# Patient Record
Sex: FEMALE | URBAN - METROPOLITAN AREA
[De-identification: names, ages, dates, MRNs, and addresses within clinical notes are randomized per-mention and may not be internally consistent; named-entity substitution may affect disease eponyms.]

---

## 2018-01-12 ENCOUNTER — TELEPHONE (OUTPATIENT)
Dept: OBGYN | Facility: CLINIC | Age: 28
End: 2018-01-12

## 2018-01-12 ENCOUNTER — TRANSFERRED RECORDS (OUTPATIENT)
Dept: HEALTH INFORMATION MANAGEMENT | Facility: CLINIC | Age: 28
End: 2018-01-12

## 2018-01-12 DIAGNOSIS — O30.042 DICHORIONIC DIAMNIOTIC TWIN PREGNANCY IN SECOND TRIMESTER: Primary | ICD-10-CM

## 2018-01-12 RX ORDER — DOXYCYCLINE 100 MG/10ML
100 INJECTION, POWDER, LYOPHILIZED, FOR SOLUTION INTRAVENOUS
Status: CANCELLED | OUTPATIENT
Start: 2018-01-12

## 2018-01-12 NOTE — TELEPHONE ENCOUNTER
Received call from Dr. Huerta that pt is being referred to [facility] for termination of di/di twin pregnancy. She requires hospital based services due to anemia and hgb S trait (sickle cell trait).  Pt is 19 6/7 weeks gestation today. PT is currently inpatient at [facility] - committed for alcohol intoxication.    Called Chris in Financial and obtained approval with completion of 'Medical Necessity Statement'. This is complete and will be given to provider to sign on 1/16/18.    Awaiting 24 hr consent that will be faxed to us.    Left message for , Merly Dorantes, to talk with patient regarding fetal remains.    Procedure scheduled for lams on 1/16/18 and surgery on 1/17/18. Dr. Huerta is communicating with staff at [facility] regarding pt appointments. Surgery instructions to be given to pt when she is here for lams.

## 2018-01-12 NOTE — TELEPHONE ENCOUNTER
"Telephone Note    Received call from NATHALIE Hicks, who is currently caring for patient at [facility]. J Luis Poe is a 27 year old  at 19w6d by 12 weeks 5 day US with di/di twins who desires pregnancy termination. She is currently on a 72 hour hold at [facility], apparently for EtOH abuse, with plan to pursue legal commitment given \"risk to fetus,\" per notes. Her pregnancy is complicated by Hgb S trait, anemia and thrombocytopenia, with most recent Hgb 9 and plts 145 on 18. Given this, it was recommended that her dilation and evacuation procedure be performed in the hospital setting. Will plan to attempt to arrange this procedure for ASAP next week.   "

## 2018-01-15 NOTE — TELEPHONE ENCOUNTER
Received call from Merly Dorantes that a  from [facility] [place], will come to [facility] tomorrow, 1/16/18 at 3:45pm to meeting with pt to discuss fetal remains options.

## 2018-01-16 ENCOUNTER — DOCUMENTATION ONLY (OUTPATIENT)
Dept: CARE COORDINATION | Facility: CLINIC | Age: 28
End: 2018-01-16

## 2018-01-16 ENCOUNTER — ALLIED HEALTH/NURSE VISIT (OUTPATIENT)
Dept: OBGYN | Facility: CLINIC | Age: 28
End: 2018-01-16
Attending: OBSTETRICS & GYNECOLOGY
Payer: COMMERCIAL

## 2018-01-16 ENCOUNTER — MEDICAL CORRESPONDENCE (OUTPATIENT)
Dept: HEALTH INFORMATION MANAGEMENT | Facility: CLINIC | Age: 28
End: 2018-01-16

## 2018-01-16 ENCOUNTER — ANESTHESIA EVENT (OUTPATIENT)
Dept: SURGERY | Facility: CLINIC | Age: 28
End: 2018-01-16
Payer: MEDICAID

## 2018-01-16 VITALS — DIASTOLIC BLOOD PRESSURE: 68 MMHG | SYSTOLIC BLOOD PRESSURE: 107 MMHG | WEIGHT: 187.2 LBS

## 2018-01-16 DIAGNOSIS — O09.92 SUPERVISION OF HIGH RISK PREGNANCY, ANTEPARTUM, SECOND TRIMESTER: Primary | ICD-10-CM

## 2018-01-16 PROCEDURE — 59200 INSERT CERVICAL DILATOR: CPT | Mod: ZF | Performed by: STUDENT IN AN ORGANIZED HEALTH CARE EDUCATION/TRAINING PROGRAM

## 2018-01-16 PROCEDURE — 25000132 ZZH RX MED GY IP 250 OP 250 PS 637: Mod: ZF

## 2018-01-16 RX ORDER — METRONIDAZOLE 500 MG/1
500 TABLET ORAL ONCE
Qty: 1 TABLET | Refills: 0 | Status: ON HOLD
Start: 2018-01-16 | End: 2018-01-17

## 2018-01-16 RX ORDER — DOXYCYCLINE HYCLATE 200 MG/1
200 TABLET, DELAYED RELEASE ORAL ONCE
Qty: 1 TABLET | Refills: 0 | Status: SHIPPED | OUTPATIENT
Start: 2018-01-16 | End: 2018-01-16 | Stop reason: ALTCHOICE

## 2018-01-16 RX ORDER — MISOPROSTOL 200 UG/1
400 TABLET ORAL ONCE
Qty: 2 TABLET | Refills: 0 | Status: SHIPPED | OUTPATIENT
Start: 2018-01-16 | End: 2018-01-16

## 2018-01-16 RX ORDER — MISOPROSTOL 200 UG/1
400 TABLET ORAL ONCE
Qty: 2 TABLET | Refills: 0 | Status: ON HOLD | OUTPATIENT
Start: 2018-01-16 | End: 2018-01-17

## 2018-01-16 RX ORDER — DOXYCYCLINE HYCLATE 200 MG/1
200 TABLET, DELAYED RELEASE ORAL ONCE
Qty: 1 TABLET | Refills: 0 | Status: SHIPPED | OUTPATIENT
Start: 2018-01-16 | End: 2018-01-16

## 2018-01-16 RX ORDER — IBUPROFEN 600 MG/1
600 TABLET, FILM COATED ORAL EVERY 6 HOURS PRN
Qty: 30 TABLET | Refills: 0 | Status: ON HOLD | OUTPATIENT
Start: 2018-01-16 | End: 2018-01-17

## 2018-01-16 NOTE — MR AVS SNAPSHOT
After Visit Summary   1/16/2018    J Luis Poe    MRN: [id number]           Patient Information     Date Of Birth          1990        Visit Information        Provider Department      1/16/2018 3:15 PM Schumer, Amy, MD; [facility] RESOURCE PROCEDURE [facility]        Today's Diagnoses     Supervision of high risk pregnancy, antepartum, second trimester    -  1      Care Instructions    Try to rest tonight.  If the gauze or cervical dilators fall out, count how many fell out and let your team know in the morning.  If you have pain, you may take 600-800 mg of ibuprofen every 6-8 hours.  Take your dose of antibiotic as directed.  Take your misoprostol (Cytotec) 2 hours before your scheduled surgery start time if you were prescribed it.  If you have severe pain or cramping tonight or heavy bleeding-soaking more than a pad an hour-please call the main clinic number, [phone].  If it is after clinic hours, remain on the line to speak with an  who will have the on call physician for [facility] call you back.          Follow-ups after your visit        Follow-up notes from your care team     Return if symptoms worsen or fail to improve.      Your next 10 appointments already scheduled     Jan 30, 2018  2:30 PM CST   Return Visit with Tiffany Sullivan MD   [facility] ([facility])    [facility] [address]  [address]  [facility] [address] [phone]              Who to contact     Please call your clinic at [phone] to:    Ask questions about your health    Make or cancel appointments    Discuss your medicines    Learn about your test results    Speak to your doctor   If you have compliments or concerns about an experience at your clinic, or if you wish to file a complaint, please contact [facility] Patient Relations at [phone] or  email us at [email or web address]         Additional Information About Your Visit        Buddy[employer] Information     [employer] is an electronic gateway that provides easy, online access to your medical records. With [employer], you can request a clinic appointment, read your test results, renew a prescription or communicate with your care team.     To sign up for [employer] visit the website at [email or web address]   You will be asked to enter the access code listed below, as well as some personal information. Please follow the directions to create your username and password.     Your access code is: [email or web address]  Expires: 4/15/2018  6:30 AM     Your access code will  in 90 days. If you need help or a new code, please contact your [facility] or call [phone] for assistance.        Care EveryWhere ID     This is your Care EveryWhere ID. This could be used by other organizations to access your [place] medical records  [id number]         Blood Pressure from Last 3 Encounters:   18 111/69   18 107/68    Weight from Last 3 Encounters:   18 84.9 kg (187 lb 2.7 oz)   18 84.9 kg (187 lb 3.2 oz)              We Performed the Following     INJECT NERV BLCK,PARACERVICAL     INSERT CERVICAL DILATOR/RIPENING AGENT INITIAL        Primary Care Provider    None Specified       No address on file        Equal Access to Services     NAYA COKER : Hadii gunner ku josefo Socandi, waaxda luqadaha, qaybta kaalmada vandana, gino morataya . So [facility] [phone].    ATENCIÓN: Si habla español, tiene a ellison disposición servicios gratuitos de asistencia lingüística. Lilaame al [phone].    We comply with applicable federal civil rights laws and [place] laws. We do not discriminate on the basis of race, color, national origin, age, disability, sex, sexual orientation, or gender identity.            Thank you!     Thank you for choosing  [facility]  for your care. Our goal is always to provide you with excellent care. Hearing back from our patients is one way we can continue to improve our services. Please take a few minutes to complete the written survey that you may receive in the mail after your visit with us. Thank you!             Your Updated Medication List - Protect others around you: Learn how to safely use, store and throw away your medicines at www.disposemymeds.org.          This list is accurate as of: 1/16/18 11:59 PM.  Always use your most recent med list.                   Brand Name Dispense Instructions for use Diagnosis    ibuprofen 600 MG tablet    ADVIL/MOTRIN    30 tablet    Take 1 tablet (600 mg) by mouth every 6 hours as needed for moderate pain (Cramping)    Supervision of high risk pregnancy, antepartum, second trimester

## 2018-01-16 NOTE — LETTER
2018       RE: J Luis Poe  [address]  [place] [address]     Dear Colleague,    Thank you for referring your patient, J Luis Poe, to the [facility] at [facility]. Please see a copy of my visit note below.      Pre-Op History & Physical     Name:            J Luis Poe  YOB: 1990              Date of Surgery: 18      SUBJECTIVE:     J Luis Poe is a 27 year old  at 20w3d by 19w3d US who desires termination of pregnancy.  She is feeling ok today. She is coming to her appointment from her mother's .  She is certain she desires termination of her pregnancy.  She asked appropriate questions regarding the procedure tomorrow.  She expresses interest in a nexplanon placement tomorrow during her D&E procedure.  She also desires footprints.    Obstetric History:   - term    -     - term    - spontaneous    - term    - surgical     Gynecologic History:   No LMP recorded.  Contraception: plans to use Nexplanon  Denies history of abnormal paps, last 2016 NIL.  Denies history of STIs    Past Medical History:   Diagnosis Date     Alcohol use disorder (H)        Past Surgical History:   Procedure Laterality Date     DILATION AND CURETTAGE SUCTION      retained POCs     DILATION AND CURETTAGE SUCTION      surgical AB       Medications:  Current Outpatient Prescriptions   Medication Sig Dispense Refill     ibuprofen (ADVIL/MOTRIN) 600 MG tablet Take 1 tablet (600 mg) by mouth every 6 hours as needed for moderate pain 30 tablet 0     cervical dilator polymer (DILAPAN S) STCK insert Apply 1 Stick to cervix once for 1 dose Apply 1 Stick to cervix once for 1 dose 5 Stick 0     laminaria japonica (DILATERIA) thin pad Place 1 pad vaginally once for 1 dose 1 pad 0     misoprostol (CYTOTEC) 200 MCG tablet Place 2 tablets (400 mcg) inside  cheek once for 1 dose Bring to hospital, take in pre-op area 2 hours prior to procedure. 2 tablet 0     metroNIDAZOLE (FLAGYL) 500 MG tablet Take 1 tablet (500 mg) by mouth once for 1 dose 1 tablet 0       Allergies:   Allergies no known allergies    Social History:  Social History   Substance Use Topics     Smoking status: Not on file     Smokeless tobacco: Not on file     Alcohol use Yes       ROS:  Significant for back pain.  All other systems reviewed and are negative.    OBJECTIVE:     /68 (BP Location: Right arm)  Wt 84.9 kg (187 lb 3.2 oz) There is no height or weight on file to calculate BMI.  General:  Alert, no distress   HEENT:  Normocephalic, without obvious abnormality   Lungs:  Unlabored breathing, no audible wheezing     CTA B/L   CV: RRR, no m/r/g   Pelvic: -normal external genitalia, no lesions  -vagina normal in appearance, without discharge  -cervix normal in appearance, no lesions  -uterus c/w GA, NT   Extremities: normal   Psychiatric  Normal orientation, mood and affect     Blood Type:   ABO   Date Value Ref Range Status   10/01/2007 O  Final     RH(D)   Date Value Ref Range Status   10/01/2007  Pos  Final         DILATORS:    Consent:  Details of the cervical dilator insertion procedure were reviewed. Risks, benefits of treatment, and alternate forms of evaluation were discussed.  Patient's questions were elicited and answered.   Written consent was obtained and scanned into medical record.     Verification of Procedure  Just before the procedure begins, through verbal and active participation of team members, I verified:   Initials   Patient Name AHS   Patient  AHS   Procedure to be performed AHS       Patient was positioned in lithotomy position. Speculum inserted.  The cervix was cleaned with betadine.  2 cc 1% lidocaine was injected at 12 o'clock on the cervical os.  The tenaculum was placed vertically. An additional 18 cc 1% lidocaine placed at 4 o'clock and 8 o'clock. The  cervix was then dilated easily to 39 Malawian using Tineo dilators.  1 extra thick laminaria and 5 Dilapan-S were inserted with sterile technique.  1 4 X 4 gauze was placed in the vagina. Pt tolerated procedure well.  She verbalized understanding that placement of dilators is the start of her procedure.    Dr. Azul was present for the entire procedure.    ASSESSMENT:     27 year old female  at 20w3d who desires D&E scheduled tomorrow here for dilator placement. Consent reviewed and signed. Discussed risk of bleeding, infection, and perforation through the uterus. Discussed that after dilator placement,  process has started.  Patient understands relative risks of options and all questions answered.    PLAN:     -- D&E: instructions reviewed and instruction booklet dispensed.   Pathology Exam: routine   Episcopalian: No   Memory box: No   Tissue Disposition: Hospital    Mementos: desires foot prints   1 Laminaria and 5 dilapan placed, one 4x4 gauze   -- Infection:  Doxycycline 200mg tomorrow am  -- Contraceptive plans: We spent 5 minutes separate from discussing D&E procedure reviewing contraception.  The patient plans for Nexplanon for contraception and would like it placed in the OR during her D&E procedure.    Women's Right to Know 24 hour consent reviewed and appropriately signed.    Amy Schumer, MD  Obstetrics and Gynecology, PGY-1  Pager: [phone]    I saw this patient with the resident and agree with the resident s findings and plan of care as documented in the resident s note.  I personally reviewed history, lab and imaging studies.  I was present for the entire laminaria placement procedure above.    Merly Azul MD    Again, thank you for allowing me to participate in the care of your patient.      Sincerely,    Amy Schumer, MD

## 2018-01-16 NOTE — MR AVS SNAPSHOT
After Visit Summary   1/16/2018    J Luis Poe    MRN: [id number]           Patient Information     Date Of Birth          1990        Visit Information        Provider Department      1/16/2018 3:15 PM Nurse, [facility]        Today's Diagnoses     Supervision of high risk pregnancy, antepartum, second trimester    -  1       Follow-ups after your visit        Your next 10 appointments already scheduled     Jan 17, 2018   Procedure with Tiffany Sullivan MD   [facility] [place], Same Day Surgery (--)    [address] [facility] [id number] [phone]            Jan 30, 2018  2:30 PM CST   Return Visit with Tiffany Sullivan MD   [facility] ([facility])    [facility] [address]  [address]  [address] [facility] [id number] [phone]              Who to contact     Please call your clinic at [phone] to:    Ask questions about your health    Make or cancel appointments    Discuss your medicines    Learn about your test results    Speak to your doctor   If you have compliments or concerns about an experience at your clinic, or if you wish to file a complaint, please contact [facility] Patient Relations at [phone] or email us at [email or web address]         Additional Information About Your Visit        MyChart Information     [employer]se[employer] is an electronic gateway that provides easy, online access to your medical records. With [employer], you can request a clinic appointment, read your test results, renew a prescription or communicate with your care team.     To sign up for [email or web address] visit the website at [email or web address]   You will be asked to enter the access code listed below, as well as some personal information. Please follow the directions to create your username and password.     Your access code is: [email or web address]  Expires: 4/15/2018  6:30 AM      Your access code will  in 90 days. If you need help or a new code, please contact your [facility] or call [phone] for assistance.        Care EveryWhere ID     This is your Care EveryWhere ID. This could be used by other organizations to access your [place] medical records  [id number]         Blood Pressure from Last 3 Encounters:   18 107/68    Weight from Last 3 Encounters:   18 84.9 kg (187 lb 3.2 oz)              Today, you had the following     No orders found for display         Today's Medication Changes          These changes are accurate as of: 18  4:17 PM.  If you have any questions, ask your nurse or doctor.               Start taking these medicines.        Dose/Directions    cervical dilator polymer Stck insert   Commonly known as:  DILAPAN S   Used for:  Supervision of high risk pregnancy, antepartum, second trimester   Started by:  Schumer, Amy, MD        Dose:  1 Stick   Apply 1 Stick to cervix once for 1 dose Apply 1 Stick to cervix once for 1 dose   Quantity:  5 Stick   Refills:  0       Doxycycline Hyclate 200 MG Tbec   Used for:  Supervision of high risk pregnancy, antepartum, second trimester   Started by:  Schumer, Amy, MD        Dose:  200 mg   Take 200 mg by mouth once for 1 dose   Quantity:  1 tablet   Refills:  0       ibuprofen 600 MG tablet   Commonly known as:  ADVIL/MOTRIN   Used for:  Supervision of high risk pregnancy, antepartum, second trimester   Started by:  Schumer, Amy, MD        Dose:  600 mg   Take 1 tablet (600 mg) by mouth every 6 hours as needed for moderate pain   Quantity:  30 tablet   Refills:  0       laminaria japonica (DILATERIA) thin pad insert   Used for:  Supervision of high risk pregnancy, antepartum, second trimester   Started by:  Schumer, Amy, MD        Dose:  1 pad   Place 1 pad vaginally once for 1 dose   Quantity:  1 pad   Refills:  0       misoprostol 200 MCG tablet   Commonly known as:   CYTOTEC   Used for:  Supervision of high risk pregnancy, antepartum, second trimester   Started by:  Schumer, Amy, MD        Dose:  400 mcg   Place 2 tablets (400 mcg) inside cheek once for 1 dose Bring to hospital, take in pre-op area 2 hours prior to procedure.   Quantity:  2 tablet   Refills:  0            Where to get your medicines      These medications were sent to [facility] [place], [place] - [address]  [address], [facility] [address]     Phone:  [phone]     ibuprofen 600 MG tablet         Some of these will need a paper prescription and others can be bought over the counter.  Ask your nurse if you have questions.     Bring a paper prescription for each of these medications     cervical dilator polymer Stck insert    Doxycycline Hyclate 200 MG Tbec    laminaria japonica (DILATERIA) thin pad insert    misoprostol 200 MCG tablet                Primary Care Provider    None Specified       No primary provider on file.        Equal Access to Services     NAYA [facility] : Hadii gunner Grimes, waaxda luqkrunal, qaybta kaalmada adedulce, gino morataya . So [facility] [phone].    ATENCIÓN: Si habla español, tiene a ellison disposición servicios gratuitos de asistencia lingüística. Lilaame al [phone].    We comply with applicable federal civil rights laws and [place] laws. We do not discriminate on the basis of race, color, national origin, age, disability, sex, sexual orientation, or gender identity.            Thank you!     Thank you for choosing [facility]  for your care. Our goal is always to provide you with excellent care. Hearing back from our patients is one way we can continue to improve our services. Please take a few minutes to complete the written survey that you may receive in the mail after your visit with us. Thank you!             Your Updated Medication List - Protect others around you: Learn how to  safely use, store and throw away your medicines at www.disposemymeds.org.          This list is accurate as of: 1/16/18  4:17 PM.  Always use your most recent med list.                   Brand Name Dispense Instructions for use Diagnosis    cervical dilator polymer Stck insert    DILAPAN S    5 Stick    Apply 1 Stick to cervix once for 1 dose Apply 1 Stick to cervix once for 1 dose    Supervision of high risk pregnancy, antepartum, second trimester       Doxycycline Hyclate 200 MG Tbec     1 tablet    Take 200 mg by mouth once for 1 dose    Supervision of high risk pregnancy, antepartum, second trimester       ibuprofen 600 MG tablet    ADVIL/MOTRIN    30 tablet    Take 1 tablet (600 mg) by mouth every 6 hours as needed for moderate pain    Supervision of high risk pregnancy, antepartum, second trimester       laminaria japonica (DILATERIA) thin pad insert     1 pad    Place 1 pad vaginally once for 1 dose    Supervision of high risk pregnancy, antepartum, second trimester       misoprostol 200 MCG tablet    CYTOTEC    2 tablet    Place 2 tablets (400 mcg) inside cheek once for 1 dose Bring to hospital, take in pre-op area 2 hours prior to procedure.    Supervision of high risk pregnancy, antepartum, second trimester

## 2018-01-16 NOTE — LETTER
Date:January 23, 2018      Patient was self referred, no letter generated. Do not send.        [facility] Health Information

## 2018-01-16 NOTE — PROGRESS NOTES
[facility]  MATERNAL CHILD HEALTH   SOCIAL WORK PROGRESS NOTE      DATA:     Received request to meet with patient at the [facility] clinic today, as clinic  unable to do so. This writer was asked to review options for fetal remains. Per Fetal & Infant loss policy patient can pursue group or individual disposition. If patient wants to pursue individual disposition or has additional questions about group disposition that provider unable to answer, social work is happy to meet with patient after her scheduled procedure tomorrow.     Patient is J Luis Poe, a 27 year old who is currently 20+ weeks pregnant with twins. This writer spoke with [facility]  (Kim Cristobal) who reported that patient was admitted through the ED at [facility] due to alcohol intoxication. It was discovered at this time patient was 19+ weeks pregnant. She has been hospitalized for the past 10 days due [facility] pursing CD commitment, which was upheld due to patient's pregnancy. Patient reported daily THC and alcohol use. Patient is coming to the hospital for elected termination. Patient is aware that her commitment would become a stay of commitment after termination, meaning she would be discharged from [facility] with the expection she follow the identified plan to attend her appointments at [facility] and start CD treatment at [facility] on 1/22/18. Patient has an open case with [facility] CPS. CPS worker is Lyn ([phone]). Additional CPS report was made by [facility]  regarding patient's alcohol use during this pregnancy. Patient reportedly has 6 children. Her parental rights have been terminated for 5 of her children, and the court is currently in the process of terminating her parental rights of her 6th child. Patient reportedly has a history of depression and has seen a therapist in the past. She is not currently seeing a therapist and is not prescribed/taking any anti-depressant medications.  Per [facility] patient reported no history of SI, HI, or self harm and denied any mental health concerns at this time.     INTERVENTION:     This writer connected with [facility] , Kim Cristobal. This writer spoke with RN at [facility] specialist today who plans to update provider.     ASSESSMENT:     Patient is connected with necessary community resources (i.e. [facility], [facility]). Patient has scheduled intake at [facility] for CD treatment on 18. No additional resource needs identified at this time.     PLAN:     Please page social work if patient has additional questions re: disposition options (i.e. wants to pursue individual disposition) or is requesting grief/loss resources. Please inform social work if patient does not show up for her appointment today as [facility] and [facility]  should be notified.       KALEN Vasques, [facility]   Social Worker  Maternal Child Health   Phone: [phone]  Pager: [phone]

## 2018-01-16 NOTE — PATIENT INSTRUCTIONS
Try to rest tonight.  If the gauze or cervical dilators fall out, count how many fell out and let your team know in the morning.  If you have pain, you may take 600-800 mg of ibuprofen every 6-8 hours.  Take your dose of antibiotic as directed.  Take your misoprostol (Cytotec) 2 hours before your scheduled surgery start time if you were prescribed it.  If you have severe pain or cramping tonight or heavy bleeding-soaking more than a pad an hour-please call the main clinic number, [phone].  If it is after clinic hours, remain on the line to speak with an  who will have the on call physician for [facility] call you back.

## 2018-01-16 NOTE — PROGRESS NOTES
Pre-Op History & Physical     Name:            J Luis Poe  YOB: 1990              Date of Surgery: 18      SUBJECTIVE:     J Luis Poe is a 27 year old  at 20w3d by 19w3d US who desires termination of pregnancy.  She is feeling ok today. She is coming to her appointment from her mother's .  She is certain she desires termination of her pregnancy.  She asked appropriate questions regarding the procedure tomorrow.  She expresses interest in a nexplanon placement tomorrow during her D&E procedure.  She also desires footprints.    Obstetric History:  2016 - term    -     - term    - spontaneous    - term    - surgical     Gynecologic History:   No LMP recorded.  Contraception: plans to use Nexplanon  Denies history of abnormal paps, last 2016 NIL.  Denies history of STIs    Past Medical History:   Diagnosis Date     Alcohol use disorder (H)        Past Surgical History:   Procedure Laterality Date     DILATION AND CURETTAGE SUCTION      retained POCs     DILATION AND CURETTAGE SUCTION      surgical AB       Medications:  Current Outpatient Prescriptions   Medication Sig Dispense Refill     ibuprofen (ADVIL/MOTRIN) 600 MG tablet Take 1 tablet (600 mg) by mouth every 6 hours as needed for moderate pain 30 tablet 0     cervical dilator polymer (DILAPAN S) STCK insert Apply 1 Stick to cervix once for 1 dose Apply 1 Stick to cervix once for 1 dose 5 Stick 0     laminaria japonica (DILATERIA) thin pad Place 1 pad vaginally once for 1 dose 1 pad 0     misoprostol (CYTOTEC) 200 MCG tablet Place 2 tablets (400 mcg) inside cheek once for 1 dose Bring to hospital, take in pre-op area 2 hours prior to procedure. 2 tablet 0     metroNIDAZOLE (FLAGYL) 500 MG tablet Take 1 tablet (500 mg) by mouth once for 1 dose 1 tablet 0       Allergies:   Allergies no known allergies    Social History:  Social History   Substance Use  Topics     Smoking status: Not on file     Smokeless tobacco: Not on file     Alcohol use Yes       ROS:  Significant for back pain.  All other systems reviewed and are negative.    OBJECTIVE:     /68 (BP Location: Right arm)  Wt 84.9 kg (187 lb 3.2 oz) There is no height or weight on file to calculate BMI.  General:  Alert, no distress   HEENT:  Normocephalic, without obvious abnormality   Lungs:  Unlabored breathing, no audible wheezing     CTA B/L   CV: RRR, no m/r/g   Pelvic: -normal external genitalia, no lesions  -vagina normal in appearance, without discharge  -cervix normal in appearance, no lesions  -uterus c/w GA, NT   Extremities: normal   Psychiatric  Normal orientation, mood and affect     Blood Type:   ABO   Date Value Ref Range Status   10/01/2007 O  Final     RH(D)   Date Value Ref Range Status   10/01/2007  Pos  Final         DILATORS:    Consent:  Details of the cervical dilator insertion procedure were reviewed. Risks, benefits of treatment, and alternate forms of evaluation were discussed.  Patient's questions were elicited and answered.   Written consent was obtained and scanned into medical record.     Verification of Procedure  Just before the procedure begins, through verbal and active participation of team members, I verified:   Initials   Patient Name AHS   Patient  AHS   Procedure to be performed AHS       Patient was positioned in lithotomy position. Speculum inserted.  The cervix was cleaned with betadine.  2 cc 1% lidocaine was injected at 12 o'clock on the cervical os.  The tenaculum was placed vertically. An additional 18 cc 1% lidocaine placed at 4 o'clock and 8 o'clock. The cervix was then dilated easily to 39 Iraqi using Tineo dilators.  1 extra thick laminaria and 5 Dilapan-S were inserted with sterile technique.  1 4 X 4 gauze was placed in the vagina. Pt tolerated procedure well.  She verbalized understanding that placement of dilators is the start of her  procedure.    Dr. Azul was present for the entire procedure.    ASSESSMENT:     27 year old female  at 20w3d who desires D&E scheduled tomorrow here for dilator placement. Consent reviewed and signed. Discussed risk of bleeding, infection, and perforation through the uterus. Discussed that after dilator placement,  process has started.  Patient understands relative risks of options and all questions answered.    PLAN:     -- D&E: instructions reviewed and instruction booklet dispensed.   Pathology Exam: routine   Alevism: No   Memory box: No   Tissue Disposition: Hospital    Mementos: desires foot prints   1 Laminaria and 5 dilapan placed, one 4x4 gauze   -- Infection:  Doxycycline 200mg tomorrow am  -- Contraceptive plans: We spent 5 minutes separate from discussing D&E procedure reviewing contraception.  The patient plans for Nexplanon for contraception and would like it placed in the OR during her D&E procedure.    Women's Right to Know 24 hour consent reviewed and appropriately signed.    Amy Schumer, MD  Obstetrics and Gynecology, PGY-1  Pager: [phone]    I saw this patient with the resident and agree with the resident s findings and plan of care as documented in the resident s note.  I personally reviewed history, lab and imaging studies.  I was present for the entire laminaria placement procedure above.    Merly Azul MD

## 2018-01-17 ENCOUNTER — ANESTHESIA (OUTPATIENT)
Dept: SURGERY | Facility: CLINIC | Age: 28
End: 2018-01-17
Payer: MEDICAID

## 2018-01-17 ENCOUNTER — HOSPITAL ENCOUNTER (OUTPATIENT)
Facility: CLINIC | Age: 28
Discharge: NURSING, REHAB OR LONG-TERM CARE | End: 2018-01-17
Attending: OBSTETRICS & GYNECOLOGY | Admitting: OBSTETRICS & GYNECOLOGY
Payer: MEDICAID

## 2018-01-17 ENCOUNTER — SURGERY (OUTPATIENT)
Age: 28
End: 2018-01-17

## 2018-01-17 ENCOUNTER — DOCUMENTATION ONLY (OUTPATIENT)
Dept: PHARMACY | Facility: CLINIC | Age: 28
End: 2018-01-17

## 2018-01-17 VITALS
BODY MASS INDEX: 31.95 KG/M2 | RESPIRATION RATE: 16 BRPM | SYSTOLIC BLOOD PRESSURE: 111 MMHG | TEMPERATURE: 98.2 F | WEIGHT: 187.17 LBS | DIASTOLIC BLOOD PRESSURE: 69 MMHG | HEIGHT: 64 IN | OXYGEN SATURATION: 98 %

## 2018-01-17 DIAGNOSIS — O09.92 SUPERVISION OF HIGH RISK PREGNANCY, ANTEPARTUM, SECOND TRIMESTER: ICD-10-CM

## 2018-01-17 DIAGNOSIS — Z98.890 POSTOPERATIVE STATE: Primary | ICD-10-CM

## 2018-01-17 DIAGNOSIS — Z33.2 ABORTION IN SECOND TRIMESTER: Primary | ICD-10-CM

## 2018-01-17 LAB
ABO + RH BLD: NORMAL
ABO + RH BLD: NORMAL
BLD GP AB SCN SERPL QL: NORMAL
BLOOD BANK CMNT PATIENT-IMP: NORMAL
ERYTHROCYTE [DISTWIDTH] IN BLOOD BY AUTOMATED COUNT: 15.4 % (ref 10–15)
GLUCOSE BLDC GLUCOMTR-MCNC: 77 MG/DL (ref 70–99)
HCT VFR BLD AUTO: 29.3 % (ref 35–47)
HGB BLD-MCNC: 9.7 G/DL (ref 11.7–15.7)
MCH RBC QN AUTO: 28.5 PG (ref 26.5–33)
MCHC RBC AUTO-ENTMCNC: 33.1 G/DL (ref 31.5–36.5)
MCV RBC AUTO: 86 FL (ref 78–100)
PLATELET # BLD AUTO: 149 10E9/L (ref 150–450)
RBC # BLD AUTO: 3.4 10E12/L (ref 3.8–5.2)
SPECIMEN EXP DATE BLD: NORMAL
WBC # BLD AUTO: 9.6 10E9/L (ref 4–11)

## 2018-01-17 PROCEDURE — 36415 COLL VENOUS BLD VENIPUNCTURE: CPT | Performed by: OBSTETRICS & GYNECOLOGY

## 2018-01-17 PROCEDURE — 86901 BLOOD TYPING SEROLOGIC RH(D): CPT | Performed by: OBSTETRICS & GYNECOLOGY

## 2018-01-17 PROCEDURE — 86850 RBC ANTIBODY SCREEN: CPT | Performed by: OBSTETRICS & GYNECOLOGY

## 2018-01-17 PROCEDURE — 25000128 H RX IP 250 OP 636: Performed by: NURSE ANESTHETIST, CERTIFIED REGISTERED

## 2018-01-17 PROCEDURE — 27210794 ZZH OR GENERAL SUPPLY STERILE: Performed by: OBSTETRICS & GYNECOLOGY

## 2018-01-17 PROCEDURE — 76499 UNLISTED DX RADIOGRAPHIC PX: CPT

## 2018-01-17 PROCEDURE — 37000008 ZZH ANESTHESIA TECHNICAL FEE, 1ST 30 MIN: Performed by: OBSTETRICS & GYNECOLOGY

## 2018-01-17 PROCEDURE — 25000132 ZZH RX MED GY IP 250 OP 250 PS 637: Performed by: STUDENT IN AN ORGANIZED HEALTH CARE EDUCATION/TRAINING PROGRAM

## 2018-01-17 PROCEDURE — 37000009 ZZH ANESTHESIA TECHNICAL FEE, EACH ADDTL 15 MIN: Performed by: OBSTETRICS & GYNECOLOGY

## 2018-01-17 PROCEDURE — 85027 COMPLETE CBC AUTOMATED: CPT | Performed by: OBSTETRICS & GYNECOLOGY

## 2018-01-17 PROCEDURE — 25000128 H RX IP 250 OP 636

## 2018-01-17 PROCEDURE — 88300 SURGICAL PATH GROSS: CPT | Performed by: OBSTETRICS & GYNECOLOGY

## 2018-01-17 PROCEDURE — 88300 SURGICAL PATH GROSS: CPT | Mod: 26 | Performed by: OBSTETRICS & GYNECOLOGY

## 2018-01-17 PROCEDURE — 25000125 ZZHC RX 250

## 2018-01-17 PROCEDURE — 71000027 ZZH RECOVERY PHASE 2 EACH 15 MINS: Performed by: OBSTETRICS & GYNECOLOGY

## 2018-01-17 PROCEDURE — 25000128 H RX IP 250 OP 636: Performed by: OBSTETRICS & GYNECOLOGY

## 2018-01-17 PROCEDURE — 40000170 ZZH STATISTIC PRE-PROCEDURE ASSESSMENT II: Performed by: OBSTETRICS & GYNECOLOGY

## 2018-01-17 PROCEDURE — 36000051 ZZH SURGERY LEVEL 2 1ST 30 MIN - UMMC: Performed by: OBSTETRICS & GYNECOLOGY

## 2018-01-17 PROCEDURE — 25000125 ZZHC RX 250: Performed by: OBSTETRICS & GYNECOLOGY

## 2018-01-17 PROCEDURE — 25000128 H RX IP 250 OP 636: Performed by: STUDENT IN AN ORGANIZED HEALTH CARE EDUCATION/TRAINING PROGRAM

## 2018-01-17 PROCEDURE — 25000125 ZZHC RX 250: Performed by: NURSE ANESTHETIST, CERTIFIED REGISTERED

## 2018-01-17 PROCEDURE — 82962 GLUCOSE BLOOD TEST: CPT

## 2018-01-17 PROCEDURE — 86900 BLOOD TYPING SEROLOGIC ABO: CPT | Performed by: OBSTETRICS & GYNECOLOGY

## 2018-01-17 PROCEDURE — 76998 US GUIDE INTRAOP: CPT

## 2018-01-17 PROCEDURE — 36000053 ZZH SURGERY LEVEL 2 EA 15 ADDTL MIN - UMMC: Performed by: OBSTETRICS & GYNECOLOGY

## 2018-01-17 RX ORDER — SODIUM CHLORIDE, SODIUM LACTATE, POTASSIUM CHLORIDE, CALCIUM CHLORIDE 600; 310; 30; 20 MG/100ML; MG/100ML; MG/100ML; MG/100ML
INJECTION, SOLUTION INTRAVENOUS CONTINUOUS PRN
Status: DISCONTINUED | OUTPATIENT
Start: 2018-01-17 | End: 2018-01-17

## 2018-01-17 RX ORDER — MISOPROSTOL 200 UG/1
200 TABLET ORAL
Qty: 5 TABLET | Refills: 0 | Status: SHIPPED | OUTPATIENT
Start: 2018-01-17 | End: 2018-03-13

## 2018-01-17 RX ORDER — ONDANSETRON 4 MG/1
4 TABLET, ORALLY DISINTEGRATING ORAL EVERY 30 MIN PRN
Status: DISCONTINUED | OUTPATIENT
Start: 2018-01-17 | End: 2018-01-17 | Stop reason: HOSPADM

## 2018-01-17 RX ORDER — BUPIVACAINE HYDROCHLORIDE 2.5 MG/ML
INJECTION, SOLUTION INFILTRATION; PERINEURAL PRN
Status: DISCONTINUED | OUTPATIENT
Start: 2018-01-17 | End: 2018-01-17 | Stop reason: HOSPADM

## 2018-01-17 RX ORDER — ONDANSETRON 2 MG/ML
INJECTION INTRAMUSCULAR; INTRAVENOUS PRN
Status: DISCONTINUED | OUTPATIENT
Start: 2018-01-17 | End: 2018-01-17

## 2018-01-17 RX ORDER — IBUPROFEN 600 MG/1
600 TABLET, FILM COATED ORAL EVERY 6 HOURS PRN
Qty: 30 TABLET | Refills: 0 | Status: SHIPPED | OUTPATIENT
Start: 2018-01-17

## 2018-01-17 RX ORDER — FENTANYL CITRATE 50 UG/ML
INJECTION, SOLUTION INTRAMUSCULAR; INTRAVENOUS PRN
Status: DISCONTINUED | OUTPATIENT
Start: 2018-01-17 | End: 2018-01-17

## 2018-01-17 RX ORDER — OXYCODONE HYDROCHLORIDE 5 MG/1
5 TABLET ORAL
Status: DISCONTINUED | OUTPATIENT
Start: 2018-01-17 | End: 2018-01-17 | Stop reason: HOSPADM

## 2018-01-17 RX ORDER — ONDANSETRON 2 MG/ML
4 INJECTION INTRAMUSCULAR; INTRAVENOUS EVERY 30 MIN PRN
Status: DISCONTINUED | OUTPATIENT
Start: 2018-01-17 | End: 2018-01-17 | Stop reason: HOSPADM

## 2018-01-17 RX ORDER — LIDOCAINE HYDROCHLORIDE 20 MG/ML
INJECTION, SOLUTION INFILTRATION; PERINEURAL PRN
Status: DISCONTINUED | OUTPATIENT
Start: 2018-01-17 | End: 2018-01-17

## 2018-01-17 RX ORDER — DOXYCYCLINE 100 MG/1
200 CAPSULE ORAL ONCE
Status: COMPLETED | OUTPATIENT
Start: 2018-01-17 | End: 2018-01-17

## 2018-01-17 RX ORDER — METHYLERGONOVINE MALEATE 0.2 MG/ML
200 INJECTION INTRAVENOUS ONCE
Status: COMPLETED | OUTPATIENT
Start: 2018-01-17 | End: 2018-01-17

## 2018-01-17 RX ORDER — FENTANYL CITRATE 50 UG/ML
25-50 INJECTION, SOLUTION INTRAMUSCULAR; INTRAVENOUS
Status: DISCONTINUED | OUTPATIENT
Start: 2018-01-17 | End: 2018-01-17 | Stop reason: HOSPADM

## 2018-01-17 RX ORDER — ACETAMINOPHEN 325 MG/1
975 TABLET ORAL ONCE
Status: COMPLETED | OUTPATIENT
Start: 2018-01-17 | End: 2018-01-17

## 2018-01-17 RX ORDER — MISOPROSTOL 200 UG/1
200 TABLET ORAL 2 TIMES DAILY
Qty: 5 TABLET | Refills: 0 | Status: SHIPPED | OUTPATIENT
Start: 2018-01-17 | End: 2018-01-17

## 2018-01-17 RX ORDER — CITRIC ACID/SODIUM CITRATE 334-500MG
30 SOLUTION, ORAL ORAL ONCE
Status: COMPLETED | OUTPATIENT
Start: 2018-01-17 | End: 2018-01-17

## 2018-01-17 RX ORDER — SODIUM CHLORIDE, SODIUM LACTATE, POTASSIUM CHLORIDE, CALCIUM CHLORIDE 600; 310; 30; 20 MG/100ML; MG/100ML; MG/100ML; MG/100ML
INJECTION, SOLUTION INTRAVENOUS CONTINUOUS
Status: DISCONTINUED | OUTPATIENT
Start: 2018-01-17 | End: 2018-01-17 | Stop reason: HOSPADM

## 2018-01-17 RX ORDER — MEPERIDINE HYDROCHLORIDE 25 MG/ML
12.5 INJECTION INTRAMUSCULAR; INTRAVENOUS; SUBCUTANEOUS
Status: DISCONTINUED | OUTPATIENT
Start: 2018-01-17 | End: 2018-01-17 | Stop reason: HOSPADM

## 2018-01-17 RX ORDER — GABAPENTIN 100 MG/1
300 CAPSULE ORAL ONCE
Status: COMPLETED | OUTPATIENT
Start: 2018-01-17 | End: 2018-01-17

## 2018-01-17 RX ORDER — ONDANSETRON 4 MG/1
4 TABLET, ORALLY DISINTEGRATING ORAL
Status: DISCONTINUED | OUTPATIENT
Start: 2018-01-17 | End: 2018-01-17 | Stop reason: HOSPADM

## 2018-01-17 RX ORDER — IBUPROFEN 600 MG/1
600 TABLET, FILM COATED ORAL
Status: DISCONTINUED | OUTPATIENT
Start: 2018-01-17 | End: 2018-01-17 | Stop reason: HOSPADM

## 2018-01-17 RX ORDER — DEXAMETHASONE SODIUM PHOSPHATE 4 MG/ML
INJECTION, SOLUTION INTRA-ARTICULAR; INTRALESIONAL; INTRAMUSCULAR; INTRAVENOUS; SOFT TISSUE PRN
Status: DISCONTINUED | OUTPATIENT
Start: 2018-01-17 | End: 2018-01-17

## 2018-01-17 RX ORDER — NALOXONE HYDROCHLORIDE 0.4 MG/ML
.1-.4 INJECTION, SOLUTION INTRAMUSCULAR; INTRAVENOUS; SUBCUTANEOUS
Status: DISCONTINUED | OUTPATIENT
Start: 2018-01-17 | End: 2018-01-17 | Stop reason: HOSPADM

## 2018-01-17 RX ORDER — METHYLERGONOVINE MALEATE 0.2 MG/1
0.2 TABLET ORAL EVERY 8 HOURS
Qty: 6 TABLET | Refills: 0 | Status: SHIPPED | OUTPATIENT
Start: 2018-01-17 | End: 2018-01-17

## 2018-01-17 RX ORDER — DOXYCYCLINE 100 MG/10ML
100 INJECTION, POWDER, LYOPHILIZED, FOR SOLUTION INTRAVENOUS
Status: COMPLETED | OUTPATIENT
Start: 2018-01-17 | End: 2018-01-17

## 2018-01-17 RX ORDER — PROPOFOL 10 MG/ML
INJECTION, EMULSION INTRAVENOUS CONTINUOUS PRN
Status: DISCONTINUED | OUTPATIENT
Start: 2018-01-17 | End: 2018-01-17

## 2018-01-17 RX ADMIN — ACETAMINOPHEN 975 MG: 325 TABLET, FILM COATED ORAL at 09:44

## 2018-01-17 RX ADMIN — PROPOFOL 250 MCG/KG/MIN: 10 INJECTION, EMULSION INTRAVENOUS at 10:20

## 2018-01-17 RX ADMIN — DOXYCYCLINE HYCLATE 200 MG: 100 CAPSULE ORAL at 12:14

## 2018-01-17 RX ADMIN — FENTANYL CITRATE 50 MCG: 50 INJECTION, SOLUTION INTRAMUSCULAR; INTRAVENOUS at 10:20

## 2018-01-17 RX ADMIN — FENTANYL CITRATE 50 MCG: 50 INJECTION, SOLUTION INTRAMUSCULAR; INTRAVENOUS at 10:30

## 2018-01-17 RX ADMIN — PHENYLEPHRINE HYDROCHLORIDE 100 MCG: 10 INJECTION, SOLUTION INTRAMUSCULAR; INTRAVENOUS; SUBCUTANEOUS at 10:43

## 2018-01-17 RX ADMIN — DOXYCYCLINE 100 MG: 100 INJECTION, POWDER, LYOPHILIZED, FOR SOLUTION INTRAVENOUS at 10:28

## 2018-01-17 RX ADMIN — ONDANSETRON 4 MG: 2 INJECTION INTRAMUSCULAR; INTRAVENOUS at 10:55

## 2018-01-17 RX ADMIN — PHENYLEPHRINE HYDROCHLORIDE 100 MCG: 10 INJECTION, SOLUTION INTRAMUSCULAR; INTRAVENOUS; SUBCUTANEOUS at 10:40

## 2018-01-17 RX ADMIN — GABAPENTIN 300 MG: 300 CAPSULE ORAL at 09:44

## 2018-01-17 RX ADMIN — PHENYLEPHRINE HYDROCHLORIDE 100 MCG: 10 INJECTION, SOLUTION INTRAMUSCULAR; INTRAVENOUS; SUBCUTANEOUS at 10:36

## 2018-01-17 RX ADMIN — DEXAMETHASONE SODIUM PHOSPHATE 4 MG: 4 INJECTION, SOLUTION INTRAMUSCULAR; INTRAVENOUS at 10:55

## 2018-01-17 RX ADMIN — MIDAZOLAM 2 MG: 1 INJECTION INTRAMUSCULAR; INTRAVENOUS at 10:16

## 2018-01-17 RX ADMIN — SODIUM CHLORIDE, POTASSIUM CHLORIDE, SODIUM LACTATE AND CALCIUM CHLORIDE: 600; 310; 30; 20 INJECTION, SOLUTION INTRAVENOUS at 10:16

## 2018-01-17 RX ADMIN — METHYLERGONOVINE MALEATE 200 MCG: 0.2 INJECTION INTRAMUSCULAR; INTRAVENOUS at 12:13

## 2018-01-17 RX ADMIN — PHENYLEPHRINE HYDROCHLORIDE 100 MCG: 10 INJECTION, SOLUTION INTRAMUSCULAR; INTRAVENOUS; SUBCUTANEOUS at 11:00

## 2018-01-17 RX ADMIN — SODIUM CITRATE AND CITRIC ACID MONOHYDRATE 30 ML: 500; 334 SOLUTION ORAL at 09:57

## 2018-01-17 RX ADMIN — ETONOGESTREL 1 EACH: 68 IMPLANT SUBCUTANEOUS at 11:16

## 2018-01-17 RX ADMIN — BUPIVACAINE HYDROCHLORIDE 4 ML: 2.5 INJECTION, SOLUTION INFILTRATION; PERINEURAL at 11:17

## 2018-01-17 RX ADMIN — LIDOCAINE HYDROCHLORIDE 80 MG: 20 INJECTION, SOLUTION INFILTRATION; PERINEURAL at 10:20

## 2018-01-17 NOTE — BRIEF OP NOTE
Brief Operative Note   Name: J Luis Poe  MRN: [id number]  : 1990  Date of Surgery: 2018    Pre-operative Diagnosis: IUP at 20w4d   Post-operative Diagnosis: Same  Procedure(s): Dilation and Evacuation, Nexplanon Placement (Left Arm)  Nexoplanon Lot Number:  [id number], Expiration Datre 2021    Surgeon: Tiffany Sullivan MD   Assistants: Stephon Mccoy MD    Anesthesia: MAC with local paracervical block  EBL: 75 mL   Fluids: 900 mL crystalloid  Specimens: Fetal tissue, for gross only  Complications: None apparent.  Findings: Normal appearing external genitalia, cervix dilated to 3cm, 50% effacement after osmotic dilators removed (1 vaginal gauze and 6 dilators). Uterine size consistent with IUP at 20w gestation. Complete uterine evacuation noted on US after proceudre.    Amanuel Mccoy MD  2018, 9:45 AM

## 2018-01-17 NOTE — IP AVS SNAPSHOT
MAIN OR    [address]    [place] [address]    Phone:  [phone]                                       After Visit Summary   1/17/2018    J Luis Poe    MRN: [id number]           After Visit Summary Signature Page     I have received my discharge instructions, and my questions have been answered. I have discussed any challenges I see with this plan with the nurse or doctor.    ..........................................................................................................................................  Patient/Patient Representative Signature      ..........................................................................................................................................  Patient Representative Print Name and Relationship to Patient    ..................................................               ................................................  Date                                            Time    ..........................................................................................................................................  Reviewed by Signature/Title    ...................................................              ..............................................  Date                                                            Time

## 2018-01-17 NOTE — OR NURSING
Patient has met criteria and is waiting for insurance clearance to cover her methergine. I placed a call to the resident to make sure that she needed this for take home,and they verified that she does need to wait for it. Pharmacy is working on getting it filled and have provided updates.Patient and fiance aware of situation.Cab voucher given for discharge.

## 2018-01-17 NOTE — IP AVS SNAPSHOT
MRN:[id number]                      After Visit Summary   1/17/2018    J Luis Poe    MRN: [id number]           Thank you!     Thank you for choosing [facility] for your care. Our goal is always to provide you with excellent care. Hearing back from our patients is one way we can continue to improve our services. Please take a few minutes to complete the written survey that you may receive in the mail after you visit with us. Thank you!        Patient Information     Date Of Birth          1990        About your hospital stay     You were admitted on:  January 17, 2018 You last received care in the:  [facility] OR    You were discharged on:  January 17, 2018       Who to Call     For medical emergencies, please call 911.  For non-urgent questions about your medical care, please call your primary care provider or clinic, None  For questions related to your surgery, please call your surgery clinic        Attending Provider     Provider Tiffany Hubbard MD OB/Gyn       Primary Care Provider Fax #    Physician No Ref-Primary [phone]      After Care Instructions     Discharge Instructions       Pelvic Rest. No tampons, douching or intercourse for  3  weeks.            Discharge Instructions       No follow up needed unless concerns for ongoing bleeding, dizziness, fever, chils            Ice to affected area       PRN as tolerated            Shower       Shower on Post-op day  0.   DO NOT take a bath                  Your next 10 appointments already scheduled     Jan 30, 2018  2:30 PM CST   Return Visit with Tiffany Sullivan MD   [facility] ([facility])    [facility] [address]  [address] [facility] [id number] [phone]              Further instructions from your care team       Same-Day Surgery   Adult Discharge Orders & Instructions     For 24 hours after surgery:  1. Get plenty of rest.  A  responsible adult must stay with you for at least 24 hours after you leave the hospital.   2. Pain medication can slow your reflexes. Do not drive or use heavy equipment.  If you have weakness or tingling, don't drive or use heavy equipment until this feeling goes away.  3. Mixing alcohol and pain medication can cause dizziness and slow your breathing. It can even be fatal. Do not drink alcohol while taking pain medication.  4. Avoid strenuous or risky activities.  Ask for help when climbing stairs.   5. You may feel lightheaded.  If so, sit for a few minutes before standing.  Have someone help you get up.   6. If you have nausea (feel sick to your stomach), drink only clear liquids such as apple juice, ginger ale, broth or 7-Up.  Rest may also help.  Be sure to drink enough fluids.  Move to a regular diet as you feel able. Take pain medications with a small amount of solid food, such as toast or crackers, to avoid nausea.   7. A slight fever is normal. Call the doctor if your fever is over 100 F (37.7 C) (taken under the tongue) or lasts longer than 24 hours.  8. You may have a dry mouth, muscle aches, trouble sleeping or a sore throat.  These symptoms should go away after 24 hours.  9. Do not make important or legal decisions.   Pain Management:      1. Take pain medication (if prescribed) for pain as directed by your physician.        2. WARNING: If the pain medication you have been prescribed contains Tylenol  (acetaminophen), DO NOT take additional doses of Tylenol (acetaminophen).     Call your doctor for any of the followin.  Signs of infection (fever, growing tenderness at the surgery site, severe pain, a large amount of drainage or bleeding, foul-smelling drainage, redness, swelling).    2.  It has been over 8 to 10 hours since surgery and you are still not able to urinate (pee).    3.  Headache for over 24 hours.    4.  Numbness, tingling or weakness the day after surgery (if you had spinal  anesthesia).  To contact a doctor, call _____________________________________ or:      [phone] and ask for the Resident On Call for:          __________________________________________ (answered 24 hours a day)      Emergency Department:  [facility]: [phone]  [facility]: [phone]               Rev. 10/2014   Discharge Instructions: Following a Dilation   and Curettage/Dilation and Evacuation    What to expect:    Expect small to moderate amount of vaginal bleeding which should taper off in 4-5 days. It should not be heavier than your regular menstrual flow.    Do not douche, and use a pad rather than tampons.     No intercourse until bleeding has ceased.    Activity:    Rest the day of surgery. You may resume normal activity the next day.    You may bathe or shower.    Avoid heavy lifting (10-15 lbs) for one week.    Comfort:    The amount of discomfort you can expect is very unpredictable. If you have pain that cannot be controlled with non-aspirin pain relievers or with the prescription you may have received, you should notify your doctor.    Abdominal cramping (like menstrual cramps) or low back ache are common and should not be a cause for concern. You will be drowsy and weak the day of surgery and possibly the following day.    Diet:    You have no restrictions on your diet. Following surgery, drink plenty of fluids and eat a light meal.    Nausea:    The anesthesia medications you received during your surgical procedure may produce some nausea.    If you feel nauseated, stay in bed, keep your head down and try drinking fluids such as Seven-Up, tea or soup.    Notify Physician at once if you experience:    A fever over 100 degrees (a low grade fever under 100 degrees is usual after surgery).    Heavy flow and/or passing large clots. Saturating more than 1 pad per hour for 2 or more hours.     Severe pain or cramps.  Rev. 5/12            Pending Results      "Date and Time Order Name Status Description    2018 1107 Surgical pathology exam In process             Admission Information     Date & Time Provider Department Dept. Phone    2018 Tiffany Sullivan MD [facility] MAIN [phone]      Your Vitals Were     Blood Pressure Temperature Respirations Height Weight Pulse Oximetry    99/57 98.2  F (36.8  C) (Axillary) 16 1.626 m (5' 4\") 84.9 kg (187 lb 2.7 oz) 97%    BMI (Body Mass Index)                   32.13 kg/m2           [facility] Information     [facility] lets you send messages to your doctor, view your test results, renew your prescriptions, schedule appointments and more. To sign up, go to [email or web address] . Click on \"Log in\" on the left side of the screen, which will take you to the Welcome page. Then click on \"Sign up Now\" on the right side of the page.     You will be asked to enter the access code listed below, as well as some personal information. Please follow the directions to create your username and password.     Your access code is: [email or web address]  Expires: 4/15/2018  6:30 AM     Your access code will  in 90 days. If you need help or a new code, please call your [facility] clinic or [phone].        Care EveryWhere ID     This is your Care EveryWhere ID. This could be used by other organizations to access your [facility] medical records  [id number]        Equal Access to Services     NAYA COKER : Hadii gunner sosa hadasho Sotoyaali, waaxda luqadaha, qaybta kaalmada adeegyada, waxchristoph landen morataya . So [facility] [phone].    ATENCIÓN: Si habla español, tiene a ellison disposición servicios gratuitos de asistencia lingüística. Llame al [phone].    We comply with applicable federal civil rights laws and [place] laws. We do not discriminate on the basis of race, color, national origin, age, disability, sex, sexual orientation, or gender identity.               Review of your medicines      START taking        Dose / " Directions    methylergonovine 0.2 MG tablet   Commonly known as:  METHERGINE   Used for:   in second trimester        Dose:  0.2 mg   Take 1 tablet (200 mcg) by mouth every 8 hours   Quantity:  6 tablet   Refills:  0         CONTINUE these medicines which may have CHANGED, or have new prescriptions. If we are uncertain of the size of tablets/capsules you have at home, strength may be listed as something that might have changed.        Dose / Directions    ibuprofen 600 MG tablet   Commonly known as:  ADVIL/MOTRIN   This may have changed:  reasons to take this   Used for:  Supervision of high risk pregnancy, antepartum, second trimester        Dose:  600 mg   Take 1 tablet (600 mg) by mouth every 6 hours as needed for moderate pain (Cramping)   Quantity:  30 tablet   Refills:  0         STOP taking     cervical dilator polymer Stck insert   Commonly known as:  DILAPAN S           laminaria japonica (DILATERIA) thin pad insert           metroNIDAZOLE 500 MG tablet   Commonly known as:  FLAGYL           misoprostol 200 MCG tablet   Commonly known as:  CYTOTEC                Where to get your medicines      These medications were sent to [facility] [address]  [address] [address]     Phone:  [phone]     ibuprofen 600 MG tablet    methylergonovine 0.2 MG tablet                Protect others around you: Learn how to safely use, store and throw away your medicines at www.disposemymeds.org.             Medication List: This is a list of all your medications and when to take them. Check marks below indicate your daily home schedule. Keep this list as a reference.      Medications           Morning Afternoon Evening Bedtime As Needed    ibuprofen 600 MG tablet   Commonly known as:  ADVIL/MOTRIN   Take 1 tablet (600 mg) by mouth every 6 hours as needed for moderate pain (Cramping)                                methylergonovine 0.2 MG tablet    Commonly known as:  METHERGINE   Take 1 tablet (200 mcg) by mouth every 8 hours

## 2018-01-17 NOTE — DISCHARGE INSTRUCTIONS
Same-Day Surgery   Adult Discharge Orders & Instructions     For 24 hours after surgery:  1. Get plenty of rest.  A responsible adult must stay with you for at least 24 hours after you leave the hospital.   2. Pain medication can slow your reflexes. Do not drive or use heavy equipment.  If you have weakness or tingling, don't drive or use heavy equipment until this feeling goes away.  3. Mixing alcohol and pain medication can cause dizziness and slow your breathing. It can even be fatal. Do not drink alcohol while taking pain medication.  4. Avoid strenuous or risky activities.  Ask for help when climbing stairs.   5. You may feel lightheaded.  If so, sit for a few minutes before standing.  Have someone help you get up.   6. If you have nausea (feel sick to your stomach), drink only clear liquids such as apple juice, ginger ale, broth or 7-Up.  Rest may also help.  Be sure to drink enough fluids.  Move to a regular diet as you feel able. Take pain medications with a small amount of solid food, such as toast or crackers, to avoid nausea.   7. A slight fever is normal. Call the doctor if your fever is over 100 F (37.7 C) (taken under the tongue) or lasts longer than 24 hours.  8. You may have a dry mouth, muscle aches, trouble sleeping or a sore throat.  These symptoms should go away after 24 hours.  9. Do not make important or legal decisions.   Pain Management:      1. Take pain medication (if prescribed) for pain as directed by your physician.        2. WARNING: If the pain medication you have been prescribed contains Tylenol  (acetaminophen), DO NOT take additional doses of Tylenol (acetaminophen).     Call your doctor for any of the followin.  Signs of infection (fever, growing tenderness at the surgery site, severe pain, a large amount of drainage or bleeding, foul-smelling drainage, redness, swelling).    2.  It has been over 8 to 10 hours since surgery and you are still not able to urinate (pee).    3.   Headache for over 24 hours.    4.  Numbness, tingling or weakness the day after surgery (if you had spinal anesthesia).  To contact a doctor, call _____________________________________ or:      [phone] and ask for the Resident On Call for:          __________________________________________ (answered 24 hours a day)      Emergency Department:  [facility]: [phone]  [facility]: [phone]               Rev. 10/2014   Discharge Instructions: Following a Dilation   and Curettage/Dilation and Evacuation    What to expect:    Expect small to moderate amount of vaginal bleeding which should taper off in 4-5 days. It should not be heavier than your regular menstrual flow.    Do not douche, and use a pad rather than tampons.     No intercourse until bleeding has ceased.    Activity:    Rest the day of surgery. You may resume normal activity the next day.    You may bathe or shower.    Avoid heavy lifting (10-15 lbs) for one week.    Comfort:    The amount of discomfort you can expect is very unpredictable. If you have pain that cannot be controlled with non-aspirin pain relievers or with the prescription you may have received, you should notify your doctor.    Abdominal cramping (like menstrual cramps) or low back ache are common and should not be a cause for concern. You will be drowsy and weak the day of surgery and possibly the following day.    Diet:    You have no restrictions on your diet. Following surgery, drink plenty of fluids and eat a light meal.    Nausea:    The anesthesia medications you received during your surgical procedure may produce some nausea.    If you feel nauseated, stay in bed, keep your head down and try drinking fluids such as Seven-Up, tea or soup.    Notify Physician at once if you experience:    A fever over 100 degrees (a low grade fever under 100 degrees is usual after surgery).    Heavy flow and/or passing large clots. Saturating more  than 1 pad per hour for 2 or more hours.     Severe pain or cramps.  Rev. 5/12

## 2018-01-17 NOTE — ANESTHESIA POSTPROCEDURE EVALUATION
Patient: J Luis Poe    Procedure(s):  Dilation and Evacuation, Nexplanon implant - Wound Class: II-Clean Contaminated   - Wound Class: I-Clean    Diagnosis:Desires Termination Of Pregnancy/Medical Reasons  Diagnosis Additional Information: No value filed.    Anesthesia Type:  MAC    Note:  Anesthesia Post Evaluation    Patient location during evaluation: Phase 2  Patient participation: Able to fully participate in evaluation  Level of consciousness: awake and alert  Pain management: adequate  Airway patency: patent  Cardiovascular status: acceptable  Respiratory status: acceptable  Hydration status: acceptable  PONV: none     Anesthetic complications: None          Last vitals:  Vitals:    01/17/18 1215 01/17/18 1230 01/17/18 1245   BP: 109/74 106/66 111/69   Resp: 16 14 16   Temp:      SpO2: 98% 96% 98%         Electronically Signed By: Reji Foster MD  January 17, 2018  1:18 PM

## 2018-01-17 NOTE — ANESTHESIA CARE TRANSFER NOTE
Patient: J Luis Poe    Procedure(s):  Dilation and Evacuation, Nexplanon implant - Wound Class: II-Clean Contaminated   - Wound Class: I-Clean    Diagnosis: Desires Termination Of Pregnancy/Medical Reasons  Diagnosis Additional Information: No value filed.    Anesthesia Type:   MAC     Note:  Airway :Room Air  Patient transferred to:Phase II  Comments: J Luis arrived in Phase 2, exchanging well.  Report given and all questions answered.Handoff Report: Identifed the Patient, Identified the Reponsible Provider, Reviewed the pertinent medical history, Discussed the surgical course, Reviewed Intra-OP anesthesia mangement and issues during anesthesia, Set expectations for post-procedure period and Allowed opportunity for questions and acknowledgement of understanding      Vitals: (Last set prior to Anesthesia Care Transfer)    CRNA VITALS  1/17/2018 1055 - 1/17/2018 1132      1/17/2018             Resp Rate (observed): (!)  1                Electronically Signed By: Liban Patel CRNA, APRN CRNA  January 17, 2018  11:32 AM

## 2018-01-17 NOTE — ANESTHESIA PREPROCEDURE EVALUATION
Physical Exam  Normal systems: cardiovascular, pulmonary and dental    Airway   Mallampati: II  TM distance: >3 FB  Neck ROM: full    Dental     Cardiovascular   Rhythm and rate: regular and normal      Pulmonary    breath sounds clear to auscultation                    Anesthesia Plan      History & Physical Review  History and physical reviewed and following examination; no interval change.    ASA Status:  2 .    NPO Status:  > 8 hours    Plan for MAC with Intravenous and Propofol induction. Maintenance will be TIVA.  Reason for MAC:  Deep or markedly invasive procedure (G8)  PONV prophylaxis:  Ondansetron (or other 5HT-3) and Dexamethasone or Solumedrol       Postoperative Care  Postoperative pain management:  IV analgesics, Oral pain medications and Multi-modal analgesia.      Consents  Anesthetic plan, risks, benefits and alternatives discussed with:  Patient..          ANESTHESIA PREOP EVALUATION    PROCEDURE: Procedure(s):  Dilation and Evacuation   (20 weeks and 4 days)  - Wound Class: II-Clean Contaminated    HPI: J Luis Poe is a 27 year old female who presents for above procedure.    PAST MEDICAL HISTORY:    Past Medical History:   Diagnosis Date     Alcohol use disorder (H)        PAST SURGICAL HISTORY:    Past Surgical History:   Procedure Laterality Date     DILATION AND CURETTAGE SUCTION      retained POCs     DILATION AND CURETTAGE SUCTION      surgical AB       PAST ANESTHESIA HISTORY:     No personal or family h/o anesthesia problems    SOCIAL HISTORY:       Social History   Substance Use Topics     Smoking status: Not on file     Smokeless tobacco: Not on file     Alcohol use Yes       ALLERGIES:     Allergies no known allergies    MEDICATIONS:     No prescriptions prior to admission.       Current Outpatient Prescriptions   Medication Sig Dispense Refill     ibuprofen (ADVIL/MOTRIN) 600 MG tablet Take 1 tablet (600 mg) by mouth every 6 hours as needed for moderate pain 30 tablet 0      cervical dilator polymer (DILAPAN S) STCK insert Apply 1 Stick to cervix once for 1 dose Apply 1 Stick to cervix once for 1 dose 5 Stick 0     laminaria japonica (DILATERIA) thin pad Place 1 pad vaginally once for 1 dose 1 pad 0     misoprostol (CYTOTEC) 200 MCG tablet Place 2 tablets (400 mcg) inside cheek once for 1 dose Bring to hospital, take in pre-op area 2 hours prior to procedure. 2 tablet 0     metroNIDAZOLE (FLAGYL) 500 MG tablet Take 1 tablet (500 mg) by mouth once for 1 dose 1 tablet 0       No current [facility]-ordered facility-administered medications on file.      Current Outpatient Prescriptions Ordered in [facility]   Medication Sig Dispense Refill     ibuprofen (ADVIL/MOTRIN) 600 MG tablet Take 1 tablet (600 mg) by mouth every 6 hours as needed for moderate pain 30 tablet 0     cervical dilator polymer (DILAPAN S) STCK insert Apply 1 Stick to cervix once for 1 dose Apply 1 Stick to cervix once for 1 dose 5 Stick 0     laminaria japonica (DILATERIA) thin pad Place 1 pad vaginally once for 1 dose 1 pad 0     misoprostol (CYTOTEC) 200 MCG tablet Place 2 tablets (400 mcg) inside cheek once for 1 dose Bring to hospital, take in pre-op area 2 hours prior to procedure. 2 tablet 0     metroNIDAZOLE (FLAGYL) 500 MG tablet Take 1 tablet (500 mg) by mouth once for 1 dose 1 tablet 0       PHYSICAL EXAM:    Vitals: T Data Unavailable, P Data Unavailable, BP Data Unavailable, R Data Unavailable, SpO2  , Weight   Wt Readings from Last 2 Encounters:   18 84.9 kg (187 lb 3.2 oz)       See doc flowsheet      No Data Recorded    No Data Recorded    No Data Recorded    No Data Recorded    No Data Recorded    Systolic (24hrs), Av , Min:107 , Max:107   Diastolic (24hrs), Av, Min:68, Max:68      NPO STATUS: see doc flowsheet    LABS:    BMP:  No results for input(s): NA, POTASSIUM, CHLORIDE, CO2, BUN, CR, GLC, GENNA in the last 86225 hours.    LFTs:   No results for input(s): PROTTOTAL, ALBUMIN, BILITOTAL, ALKPHOS,  AST, ALT, BILIDIRECT in the last 73795 hours.    CBC:   No results for input(s): WBC, RBC, HGB, HCT, MCV, MCH, MCHC, RDW, PLT in the last 51555 hours.    Coags:  No results for input(s): INR, PTT, FIBR in the last 95931 hours.    Imaging:  No orders to display       Reji Foster MD  Anesthesiology Staff  Pager [phone]    1/16/2018  9:33 PM                    .

## 2018-01-17 NOTE — OP NOTE
[facility]  OPERATIVE NOTE: DILATION AND EVACUATION   DATE: 1/17/2018  PATIENT: J Luis Poe  SURGEON: Tiffany Sullivan MD  ASSISTANT(S): Stephon Mccoy MD, PGY1  PRE-OPERATIVE DIAGNOSIS:   1. Twin IUP at 20w4d, desires pregnancy termination  POST-OPERATIVE DIAGNOSIS:   Same, s/p procedure    PROCEDURE: EUA, Dilation and Evacuation under ultrasound guidance; Nexplanon Placement    ANESTHESIA: MAC, cervical block  EBL: 75 mL   IVF: 900 mL LR   UOP: Patient voided before procedure  COMPLICATIONS: None apparent   SPECIMEN(S):   1. Uterine contents - for gross only    INDICATIONS: J Luis Poe is a 27 year old at 20w4d by 19w3d US who presents for termination of pregnancy. The patient was counseled on risks, benefits and alternatives to the above listed procedure. She received information in compliance with the [place] Woman's Right to Know Act at least 24 hours prior to the procedure. Informed consent was signed prior to the procedure.    FINDINGS:   Normal appearing external genitalia and vaginal mucosa  Cervix 3cm, 50% effaced after dilator removal, uterine size consistent with gestational age  Fetal tissue inspection at end was complete for GA  Thin EMS and fundus firm at end of D&E    PROCEDURE:   The patient was taken to the operating room and general anesthesia was administered.  She was then placed in the dorsal lithotomy position. One 4x4 gauze sponge and 6 osmotic dilators which were placed the prior day were removed.  The patient was then prepped and draped in the usual sterile fashion and a safety timeout performed.    An open-sided speculum was placed into the vagina and the anterior lip of the cervix grasped with a tenaculum. Cervical anesthesia was injected using a total of 20 cc of local anesthetic. Ultrasound guidance was used during all subsequent intrauterine instrumentation. The cervix was dilated with Tineo dilators to 63 [place]e. An 11-curved suction and Bierer forceps  was used to create an amniotomy and evacuate placental and fetal tissue. The suction cannula was inserted after all fetal tissue was presumed to be removed and suction aspiration was performed until a gritty texture noted throughout the cavity.    The tenaculum was removed from the cervix.  The cervix and vaginal vault were inspected. Good hemostasis was noted. The instruments were removed from the vagina.      Next, attention was turned to patient's left arm for Nexplanon placement. The groove between the biceps and triceps muscles was palpated. The skin was cleansed with iodine solution 2cm inferior to the groove, and 5cc of 1% lidocaine was inserted along the path of the insert. The nexplanon was inserted at a 30 degree angle, and then tented up and advanced superficially just beneath the epidermis. The implant was palpated from on top of the skin after implant by both Dr. Mccoy and Dr. Sullivan. Hemostasis was achieved with pressure and a band-aid. Sponge and needle counts were correct at the close of the case x 2.  After anesthesia reversal, the patient was transferred to the recovery room in stable condition.      Dr. Sullivan was present for the entire procedure.    Amanuel Mccoy MD  Obstetrics & Gynecology, PGY1  January 17, 2018, 2:02 PM    Tiffany Sullivan MD

## 2018-01-19 NOTE — PROGRESS NOTES
Prior Authorization Approval    Methergine 0.2mg  Date Initiated: 01/16/2017  Date Completed: 01/17/2017  Prior Auth Type: Non-Formulary     Status: Approved    Effective Date: Start Date:01/16/2018;Coverage End Date:01/17/2019    Copay: 0.00  [place] Filled: No. Medication substituted prior to approval.     Insurance: [facility]  Ph: [phone]  ID: [id number]  Case Number: [id number]  Submitted Via: Marimar Meehan [place] [facility] Liaison, pager [phone]

## 2018-01-20 LAB — COPATH REPORT: NORMAL

## 2018-03-13 ENCOUNTER — OFFICE VISIT (OUTPATIENT)
Dept: OBGYN | Facility: CLINIC | Age: 28
End: 2018-03-13
Attending: OBSTETRICS & GYNECOLOGY
Payer: COMMERCIAL

## 2018-03-13 VITALS
HEART RATE: 79 BPM | WEIGHT: 186.8 LBS | SYSTOLIC BLOOD PRESSURE: 102 MMHG | DIASTOLIC BLOOD PRESSURE: 65 MMHG | HEIGHT: 64 IN | BODY MASS INDEX: 31.89 KG/M2

## 2018-03-13 DIAGNOSIS — Z30.46 ENCOUNTER FOR SURVEILLANCE OF IMPLANTABLE SUBDERMAL CONTRACEPTIVE: Primary | ICD-10-CM

## 2018-03-13 PROCEDURE — G0463 HOSPITAL OUTPT CLINIC VISIT: HCPCS | Mod: ZF

## 2018-03-13 NOTE — PROGRESS NOTES
"Pt is here for follow up and D and E elective on 1/17 and nexplanon placement.  She reports the bleeding stopped end of January and she has had one normal period since then.  She is living in a treatment facility until May.      O:  /65  Pulse 79  Ht 1.626 m (5' 4\")  Wt 84.7 kg (186 lb 12.8 oz)  LMP 03/12/2018 (Exact Date)  BMI 32.06 kg/m2  Left upper arm- Nexplanon palpable insertion site is well healed.    A/P Doing well 2 months post op  Happy with current contraceptive plan.  Enc pt to stay clean and sober.  Reminded pt of effectiveness of Nexplanon and expected bleeding profile.    Tiffany Sullivan MD    "

## 2018-03-13 NOTE — LETTER
Date:March 14, 2018      Patient was self referred, no letter generated. Do not send.        [facility] Information

## 2018-03-13 NOTE — LETTER
"3/13/2018       RE: J Luis Poe  [address]  [place] [address]     Dear Colleague,    Thank you for referring your patient, J Luis Poe, to the [facility] at [facility]. Please see a copy of my visit note below.    Pt is here for follow up and D and E elective on 1/17 and nexplanon placement.  She reports the bleeding stopped end of January and she has had one normal period since then.  She is living in a treatment facility until May.      O:  /65  Pulse 79  Ht 1.626 m (5' 4\")  Wt 84.7 kg (186 lb 12.8 oz)  LMP 03/12/2018 (Exact Date)  BMI 32.06 kg/m2  Left upper arm- Nexplanon palpable insertion site is well healed.    A/P Doing well 2 months post op  Happy with current contraceptive plan.  Enc pt to stay clean and sober.  Reminded pt of effectiveness of Nexplanon and expected bleeding profile.    Tiffany Sullivan MD      Again, thank you for allowing me to participate in the care of your patient.      Sincerely,    Tiffany Sullivan MD      "

## 2018-03-13 NOTE — MR AVS SNAPSHOT
"              After Visit Summary   3/13/2018    J Luis Poe    MRN: [id number]           Patient Information     Date Of Birth          1990        Visit Information        Provider Department      3/13/2018 10:45 AM Tiffany Sullivan MD [facility]        Today's Diagnoses     Encounter for surveillance of implantable subdermal contraceptive    -  1       Follow-ups after your visit        Who to contact     Please call your clinic at [phone] to:    Ask questions about your health    Make or cancel appointments    Discuss your medicines    Learn about your test results    Speak to your doctor            Additional Information About Your Visit        MyChart Information     [employer] is an electronic gateway that provides easy, online access to your medical records. With [employer], you can request a clinic appointment, read your test results, renew a prescription or communicate with your care team.     To sign up for [employer] visit the website at [email or web address]   You will be asked to enter the access code listed below, as well as some personal information. Please follow the directions to create your username and password.     Your access code is: [email or web address]  Expires: 4/15/2018  7:30 AM     Your access code will  in 90 days. If you need help or a new code, please contact your [facility] or call [phone] for assistance.        Care EveryWhere ID     This is your Care EveryWhere ID. This could be used by other organizations to access your [place] medical records  [id number]        Your Vitals Were     Pulse Height Last Period BMI (Body Mass Index)          79 1.626 m (5' 4\") 2018 (Exact Date) 32.06 kg/m2         Blood Pressure from Last 3 Encounters:   18 102/65   18 111/69   18 107/68    Weight from Last 3 Encounters:   18 84.7 kg (186 lb 12.8 oz)   18 84.9 kg (187 lb 2.7 oz)   18 84.9 " kg (187 lb 3.2 oz)              Today, you had the following     No orders found for display         Today's Medication Changes          These changes are accurate as of 3/13/18 11:08 AM.  If you have any questions, ask your nurse or doctor.               Stop taking these medicines if you haven't already. Please contact your care team if you have questions.     misoprostol 200 MCG tablet   Commonly known as:  CYTOTEC   Stopped by:  Tiffany Sullivan MD                    Primary Care Provider Fax #    Physician No Ref-Primary [phone]       No address on file        Equal Access to Services     [facility]: Hadii gunner sosa hadasho Socandi, waaxda luqadaha, qaybta kaalmada adeegyablayne, gino morataya . So [facility] [phone].    ATENCIÓN: Si habla español, tiene a ellison disposición servicios gratuitos de asistencia lingüística. Llame al [phone].    We comply with applicable federal civil rights laws and [place] laws. We do not discriminate on the basis of race, color, national origin, age, disability, sex, sexual orientation, or gender identity.            Thank you!     Thank you for choosing [facility]  for your care. Our goal is always to provide you with excellent care. Hearing back from our patients is one way we can continue to improve our services. Please take a few minutes to complete the written survey that you may receive in the mail after your visit with us. Thank you!             Your Updated Medication List - Protect others around you: Learn how to safely use, store and throw away your medicines at www.disposemymeds.org.          This list is accurate as of 3/13/18 11:08 AM.  Always use your most recent med list.                   Brand Name Dispense Instructions for use Diagnosis    ibuprofen 600 MG tablet    ADVIL/MOTRIN    30 tablet    Take 1 tablet (600 mg) by mouth every 6 hours as needed for moderate pain (Cramping)    Supervision of high  risk pregnancy, antepartum, second trimester

## (undated) DEVICE — SYR 01ML 27GA 0.5" NDL TBC 309623

## (undated) DEVICE — GLOVE PROTEXIS W/NEU-THERA 6.5  2D73TE65

## (undated) DEVICE — TUBING VACUUM COLLECTION 6FT 23116

## (undated) DEVICE — Device

## (undated) DEVICE — SPECIMEN TRAP VACUUM SUCTION SAFETOUCH 003853-902

## (undated) DEVICE — LINEN GOWN X4 5410

## (undated) DEVICE — SUCTION CANNULA UTERINE 12MM CVD  21555

## (undated) DEVICE — SUCTION VACUUM CANISTER STANDARD W/LID&CAPS 003987-901

## (undated) DEVICE — STRAP KNEE/BODY 31143004

## (undated) DEVICE — NDL COUNTER 20CT 31142493

## (undated) DEVICE — NDL 18GA 1.5" 305196

## (undated) DEVICE — SOL NACL 0.9% IRRIG 1000ML BOTTLE 2F7124

## (undated) DEVICE — LINEN TOWEL PACK X5 5464

## (undated) DEVICE — PAD CHUX UNDERPAD 30X30"

## (undated) DEVICE — BLADE KNIFE SURG 10 371110

## (undated) RX ORDER — ACETAMINOPHEN 325 MG/1
TABLET ORAL
Status: DISPENSED
Start: 2018-01-17

## (undated) RX ORDER — PROPOFOL 10 MG/ML
INJECTION, EMULSION INTRAVENOUS
Status: DISPENSED
Start: 2018-01-17

## (undated) RX ORDER — CITRIC ACID/SODIUM CITRATE 334-500MG
SOLUTION, ORAL ORAL
Status: DISPENSED
Start: 2018-01-17

## (undated) RX ORDER — ONDANSETRON 2 MG/ML
INJECTION INTRAMUSCULAR; INTRAVENOUS
Status: DISPENSED
Start: 2018-01-17

## (undated) RX ORDER — FENTANYL CITRATE 50 UG/ML
INJECTION, SOLUTION INTRAMUSCULAR; INTRAVENOUS
Status: DISPENSED
Start: 2018-01-17

## (undated) RX ORDER — GABAPENTIN 300 MG/1
CAPSULE ORAL
Status: DISPENSED
Start: 2018-01-17

## (undated) RX ORDER — DEXAMETHASONE SODIUM PHOSPHATE 4 MG/ML
INJECTION, SOLUTION INTRA-ARTICULAR; INTRALESIONAL; INTRAMUSCULAR; INTRAVENOUS; SOFT TISSUE
Status: DISPENSED
Start: 2018-01-17